# Patient Record
Sex: FEMALE | Race: WHITE | NOT HISPANIC OR LATINO | Employment: OTHER | URBAN - METROPOLITAN AREA
[De-identification: names, ages, dates, MRNs, and addresses within clinical notes are randomized per-mention and may not be internally consistent; named-entity substitution may affect disease eponyms.]

---

## 2024-05-20 ENCOUNTER — OFFICE VISIT (OUTPATIENT)
Dept: URGENT CARE | Facility: URGENT CARE | Age: 79
End: 2024-05-20
Payer: COMMERCIAL

## 2024-05-20 VITALS
OXYGEN SATURATION: 98 % | TEMPERATURE: 98 F | SYSTOLIC BLOOD PRESSURE: 118 MMHG | HEART RATE: 72 BPM | RESPIRATION RATE: 20 BRPM | DIASTOLIC BLOOD PRESSURE: 71 MMHG

## 2024-05-20 DIAGNOSIS — S05.01XA ABRASION OF RIGHT CORNEA, INITIAL ENCOUNTER: Primary | ICD-10-CM

## 2024-05-20 PROCEDURE — 99203 OFFICE O/P NEW LOW 30 MIN: CPT | Performed by: PHYSICIAN ASSISTANT

## 2024-05-20 RX ORDER — VALACYCLOVIR HYDROCHLORIDE 500 MG/1
500 TABLET, FILM COATED ORAL DAILY
COMMUNITY

## 2024-05-20 RX ORDER — DULOXETIN HYDROCHLORIDE 20 MG/1
CAPSULE, DELAYED RELEASE ORAL
COMMUNITY

## 2024-05-20 RX ORDER — LISINOPRIL 30 MG/1
TABLET ORAL
COMMUNITY
Start: 2023-06-02

## 2024-05-20 RX ORDER — ALENDRONATE SODIUM 70 MG/1
TABLET ORAL
COMMUNITY

## 2024-05-20 RX ORDER — PREDNISONE 1 MG/1
3 TABLET ORAL 2 TIMES DAILY
COMMUNITY

## 2024-05-20 RX ORDER — CHOLECALCIFEROL (VITAMIN D3) 10 MCG (400 UNIT) TABLET
10
COMMUNITY
Start: 2023-01-06

## 2024-05-20 RX ORDER — NITROFURANTOIN 25; 75 MG/1; MG/1
100 CAPSULE ORAL 2 TIMES DAILY
COMMUNITY
Start: 2024-01-23

## 2024-05-20 RX ORDER — FLUCONAZOLE 150 MG/1
TABLET ORAL
COMMUNITY
Start: 2024-01-23

## 2024-05-20 RX ORDER — ACETAMINOPHEN 500 MG
500 TABLET ORAL
COMMUNITY
Start: 2023-01-06

## 2024-05-20 RX ORDER — ERYTHROMYCIN 5 MG/G
0.5 OINTMENT OPHTHALMIC 3 TIMES DAILY
Qty: 3.5 G | Refills: 0 | Status: SHIPPED | OUTPATIENT
Start: 2024-05-20 | End: 2024-05-27

## 2024-05-20 RX ORDER — METHOTREXATE 2.5 MG/1
TABLET ORAL
COMMUNITY

## 2024-05-20 RX ORDER — ATORVASTATIN CALCIUM 20 MG/1
20 TABLET, FILM COATED ORAL DAILY
COMMUNITY

## 2024-05-20 NOTE — PROGRESS NOTES
SUBJECTIVE:  Chief Complaint:   Chief Complaint   Patient presents with    Eye Problem     R eye red  since this morning    R10/16 L 10/20 BOTH 10/20    URI     History of Present Illness:  Tonia Chicas is a 78 year old female who presents complaining of mild right eye redness, possible foreign body for 4 hour(s).   Onset/timing: upon waking.    Associated Signs and Symptoms: none  Treatment measures tried include: flushed with water   EXPOSURE: daughter's dog shedding    History reviewed. No pertinent past medical history.  Current Outpatient Medications   Medication Sig Dispense Refill    acetaminophen (TYLENOL) 500 MG tablet Take 500 mg by mouth      alendronate (FOSAMAX) 70 MG tablet TAKE ONE TABLET BY MOUTH ONCE A WEEK ON AN EMPTY STOMACH WITH 8 OUNCES OF WATER REMAIN UPRIGHT FOR 30 MINUTES AFTERWARD      atorvastatin (LIPITOR) 20 MG tablet Take 20 mg by mouth daily      Cholecalciferol (DELTA D3) 10 MCG (400 UNIT) TABS Take 10 mcg by mouth      DULoxetine (CYMBALTA) 20 MG capsule TAKE 1 CAPSULE BY MOUTH ONCE DAILY FOR MOOD DOSE CHANGE 11/16/2022      erythromycin (ROMYCIN) 5 MG/GM ophthalmic ointment Place 0.5 inches into the right eye 3 times daily for 7 days 3.5 g 0    fluconazole (DIFLUCAN) 150 MG tablet TAKE ONE TABLET BY MOUTH NOW, REPEAT DOSE IN 7 DAYS IF SYMPTOMS PERSIST.      lisinopril (ZESTRIL) 30 MG tablet TAKE 1 TABLET BY MOUTH ONCE DAILY FOR BLOOD PRESSURE. PILL SIZE CHANGE AS OF 01/06/2023      methotrexate 2.5 MG tablet TAKE 8 TABLETS BY MOUTH ONCE A WEEK. REMEMBER TO DO BLOOD TESTS EVERY 3 MONTHS      nitroFURantoin macrocrystal-monohydrate (MACROBID) 100 MG capsule Take 100 mg by mouth 2 times daily      omeprazole (PRILOSEC) 20 MG DR capsule Take 20 mg by mouth      predniSONE (DELTASONE) 1 MG tablet Take 3 mg by mouth 2 times daily      valACYclovir (VALTREX) 500 MG tablet Take 500 mg by mouth daily          ROS:  Review of systems negative except as stated above.    OBJECTIVE:  BP  118/71   Pulse 72   Temp 98  F (36.7  C)   Resp 20   SpO2 98%   General: no acute distress  Eye exam: discomfort resolved with topical analgesia, no uptake of flouresciene in right eye  left eye normal lid, conjunctiva, cornea, pupil and fundus.      ASSESSMENT:  (S05.01XA) Abrasion of right cornea, initial encounter  (primary encounter diagnosis)  Comment: will treat based on history, exam.  Plan: erythromycin (ROMYCIN) 5 MG/GM ophthalmic         ointment      Red flags and emergent follow up discussed, and understood by patient  Follow up with PCP if symptoms worsen or fail to improve in 2 days

## 2024-07-05 ENCOUNTER — OFFICE VISIT (OUTPATIENT)
Dept: URGENT CARE | Facility: URGENT CARE | Age: 79
End: 2024-07-05
Payer: COMMERCIAL

## 2024-07-05 VITALS
OXYGEN SATURATION: 98 % | SYSTOLIC BLOOD PRESSURE: 129 MMHG | TEMPERATURE: 97.1 F | DIASTOLIC BLOOD PRESSURE: 72 MMHG | HEART RATE: 61 BPM

## 2024-07-05 DIAGNOSIS — R58 ECCHYMOSIS: ICD-10-CM

## 2024-07-05 DIAGNOSIS — S51.819A SKIN TEAR OF FOREARM WITHOUT COMPLICATION, INITIAL ENCOUNTER: Primary | ICD-10-CM

## 2024-07-05 PROCEDURE — 99213 OFFICE O/P EST LOW 20 MIN: CPT | Performed by: FAMILY MEDICINE

## 2024-07-05 RX ORDER — CEPHALEXIN 500 MG/1
500 CAPSULE ORAL 4 TIMES DAILY
Qty: 40 CAPSULE | Refills: 0 | Status: SHIPPED | OUTPATIENT
Start: 2024-07-05 | End: 2024-07-05

## 2024-07-05 RX ORDER — CEPHALEXIN 500 MG/1
500 CAPSULE ORAL 4 TIMES DAILY
Qty: 40 CAPSULE | Refills: 0 | Status: SHIPPED | OUTPATIENT
Start: 2024-07-05

## 2024-07-05 NOTE — PROGRESS NOTES
SUBJECTIVE:  Chief Complaint   Patient presents with    Urgent Care     Fall 2 days ago and has couple of large cuts on her left arm     Tonia Chicas is a 78 year old female presents with a chief complaint of fall injury with wound on left arm    Lost her balance, ended up falling and sustained large skin tear on left forearm area.  No LOC.  Injury occurred on 7/3 around 3:00pm at home.      No blood thinner use.    Daughter did come over to help with dressing.  Applied vaseline gauze to wound but still sticking.    Takes tylenol for pain, will add ibuprofen and this does help ease up the pain    Tdap 2021    No past medical history on file.  Current Outpatient Medications   Medication Sig Dispense Refill    acetaminophen (TYLENOL) 500 MG tablet Take 500 mg by mouth      alendronate (FOSAMAX) 70 MG tablet TAKE ONE TABLET BY MOUTH ONCE A WEEK ON AN EMPTY STOMACH WITH 8 OUNCES OF WATER REMAIN UPRIGHT FOR 30 MINUTES AFTERWARD      atorvastatin (LIPITOR) 20 MG tablet Take 20 mg by mouth daily      Cholecalciferol (DELTA D3) 10 MCG (400 UNIT) TABS Take 10 mcg by mouth      DULoxetine (CYMBALTA) 20 MG capsule TAKE 1 CAPSULE BY MOUTH ONCE DAILY FOR MOOD DOSE CHANGE 11/16/2022      lisinopril (ZESTRIL) 30 MG tablet TAKE 1 TABLET BY MOUTH ONCE DAILY FOR BLOOD PRESSURE. PILL SIZE CHANGE AS OF 01/06/2023      methotrexate 2.5 MG tablet TAKE 8 TABLETS BY MOUTH ONCE A WEEK. REMEMBER TO DO BLOOD TESTS EVERY 3 MONTHS      omeprazole (PRILOSEC) 20 MG DR capsule Take 20 mg by mouth      predniSONE (DELTASONE) 1 MG tablet Take 3 mg by mouth 2 times daily      valACYclovir (VALTREX) 500 MG tablet Take 500 mg by mouth daily      fluconazole (DIFLUCAN) 150 MG tablet TAKE ONE TABLET BY MOUTH NOW, REPEAT DOSE IN 7 DAYS IF SYMPTOMS PERSIST. (Patient not taking: Reported on 7/5/2024)      nitroFURantoin macrocrystal-monohydrate (MACROBID) 100 MG capsule Take 100 mg by mouth 2 times daily (Patient not taking: Reported on 7/5/2024)        Social History     Tobacco Use    Smoking status: Not on file    Smokeless tobacco: Not on file   Substance Use Topics    Alcohol use: Not on file       ROS:  Review of systems negative except as stated above.    EXAM:   /72   Pulse 61   Temp 97.1  F (36.2  C) (Tympanic)   SpO2 98%   Gen: healthy,alert,no distress  Extremity: left forearm has large skin tear, denuded wound with scant bleeding after dressing was removed.  Had smaller skin tear wound on posterior left arm and large ecchymosis  There is not compromise to the distal circulation.  Pulses are +2 and CRT is brisk  PSYCH:alert, affect bright    X-RAY was not done.    ASSESSMENT/PLAN:   (S51.003R) Skin tear of forearm without complication, initial encounter  (primary encounter diagnosis)  Comment: s/p fall  Plan: cephALEXin (KEFLEX) 500 MG capsule,         DISCONTINUED: cephALEXin (KEFLEX) 500 MG         capsule            (R58) Ecchymosis  Comment: s/p fall, left elbow/arm  Plan: ice, heat, monitor      Reassurance given, reviewed wound care and due to large open skin wound/skin tear that has increase risk of wound infection - RX keflex given to prevent wound infection.  Discussed vaseline ointment or antibiotic ointment to wound, non-sticky telfa dressing and gauze wrap.  Okay to change dressing once a day.  Ice or heat to ecchymosis area.    Follow up in clinic if no improvement of wound in 1 week    Singh Collier MD  July 5, 2024 6:01 PM

## 2024-07-05 NOTE — PATIENT INSTRUCTIONS
Okay to take ibuprofen 200 mg - 3 tablets (600 mg) every 8 hours as needed.  Okay to take tylenol 500 mg - 2 tablets (1000 mg) every 6-8 hours as needed, do not exceed 3000 mg in 24 hours.    Take keflex to prevent wound infection (2-3 times a day), will need to take 4 times daily if wound is infected - redness, drainage, more pain    Ice or heat to bruise area   Doxycycline Counseling:  Patient counseled regarding possible photosensitivity and increased risk for sunburn.  Patient instructed to avoid sunlight, if possible.  When exposed to sunlight, patients should wear protective clothing, sunglasses, and sunscreen.  The patient was instructed to call the office immediately if the following severe adverse effects occur:  hearing changes, easy bruising/bleeding, severe headache, or vision changes.  The patient verbalized understanding of the proper use and possible adverse effects of doxycycline.  All of the patient's questions and concerns were addressed.

## 2025-04-04 NOTE — PROGRESS NOTES
"PHYSICAL THERAPY EVALUATION  Type of Visit: Evaluation       Fall Risk Screen:  Fall screen completed by: PT  Have you fallen 2 or more times in the past year?: Yes  Have you fallen and had an injury in the past year?: Yes  Is patient a fall risk?: Yes  Fall screen comments: coming to PT to address impairments    Subjective         Presenting condition or subjective complaint: unsteady and falls  Date of onset: 04/02/25 (order date due to chronicity of the problem)    Relevant medical history: Arthritis; Dizziness; Fibromyalgia; High blood pressure; Menopause; Osteoporosis; Pain at night or rest; Rheumatoid arthritis; Significant weakness; Vision problems   Dates & types of surgery: gallbladder removal    Prior diagnostic imaging/testing results: Other     Prior therapy history for the same diagnosis, illness or injury: No      Prior Level of Function  Transfers:   Ambulation:   ADL:   IADL:     Living Environment  Social support: Alone   Type of home: [facility]; Multi-level   Stairs to enter the home: No       Ramp: No   Stairs inside the home: Yes 17 Is there a railing: Yes     Help at home: Home management tasks (cooking, cleaning); Medication and/or finances; Assist for driving and community activities  Equipment owned: Straight Cane; Walker; Bath bench     Employment: Yes    Hobbies/Interests: sewing reading    Patient goals for therapy: walk more confidently without fear of falling    Pt reports she is fearful of falling. Most recent fall was within the past week. She typically falls forward. Pt does have dizziness but has not been dizzy in over a week. Her most recent fall was after walking up the stairs and then she fell forward. She has a 4ww but does not \"like it\". She uses her SEC most of the time for past few weeks. She goes up and down a lot of steps in her home. She was having bad vertigo for weeks- it has come and gone in the past but this time persisted for much longer. Her PCP wanted to schedule an " MRI and neurologist appt- she cancelled it and has not re-scheduled. She has been vestibular, ear and eye testing recently and did not find anything. Her family cleaned out her ears with drops and flushed her ears out at home. After that, her dizziness seems to have resolved.    Pain assessment:      Objective      Cognitive Status Examination  Orientation: Oriented to person, place and time   Level of Consciousness: Alert  Follows Commands and Answers Questions: 100% of the time  Personal Safety and Judgement: Intact  Memory:  per pt, impaired     OBSERVATION:   INTEGUMENTARY: Intact  POSTURE: Sitting Posture: Rounded shoulders, Forward head, Thoracic kyphosis increased  PALPATION:   RANGE OF MOTION: LE ROM WFL  STRENGTH:  assess ankle DF at next session    BED MOBILITY: Independent    TRANSFERS: Independent with UE support     WHEELCHAIR MOBILITY:     GAIT:   Level of Chambers: Independent  Assistive Device(s): Cane (single end)  Gait Deviations: Base of support increased  Stride length decreased  Ana decreased  Gait Distance: 30 ft  Stairs: not assessed    BALANCE:     SPECIAL TESTS  Functional Gait Assessment (FGA)      10 Meter Walk Test (Comfortable)  0.529 m/s without assistive device; age-matched norm: 1.132 m/s   10 Meter Walk Test (Fast)  0.710 m/s   6 Minute Walk Test (6MWT)      228 m with SEC; age-matched norm: 471 m  Did not require standing rest break, Fatigue/OMNI Effort Scale: 7-8/10   [place] Balance Scale (BBS)     5 Times Sit-to-Stand (5TSTS)  17.08 sec, with UE support, standard height     Dynamic Gait Index (DGI)     Timed Up and Go (TUG) - sec    Single Leg Stance Right (sec)    Single Leg Stance Left (sec)    Modified CTSIB Conditions (sec) Cond 1: 30 sec  Cond 2: 20 sec  Cond 4:   Cond 5 :    Romberg  (sec)    Sharpened Romberg (sec)    30 Second Sit to Stand (reps/height)    Mini-BESTest              SENSATION:     REFLEXES:   COORDINATION:   MUSCLE TONE:         Assessment & Plan    CLINICAL IMPRESSIONS  Medical Diagnosis: unsteadiness, gait impairment    Treatment Diagnosis: impairment of balance with household/community activities   Impression/Assessment: Patient is a 79 year old female with balance and gait impairments complaints.  The following significant findings have been identified: Pain, Decreased strength, Impaired balance, Impaired gait, Impaired muscle performance, Decreased activity tolerance, Impaired posture, and Instability. These impairments interfere with their ability to perform self care tasks, recreational activities, household chores, household mobility, and community mobility as compared to previous level of function.     Clinical Decision Making (Complexity):  Clinical Presentation: Stable/Uncomplicated  Clinical Presentation Rationale: based on medical and personal factors listed in PT evaluation  Clinical Decision Making (Complexity): Low complexity    PLAN OF CARE  Treatment Interventions:  Modalities: Cryotherapy, Hot Pack  Interventions: Gait Training, Manual Therapy, Neuromuscular Re-education, Therapeutic Activity, Therapeutic Exercise, Self-Care/Home Management    Long Term Goals     PT Goal 1  Goal Identifier: HEP  Goal Description: Pt will demonstrate IND with strength, balance, and muscular and aerobic endurance HEP, while working to improve capacity for functional mobility.  Goal Progress: Initiated at evaluation; see PTRx.  Target Date: 07/05/25  PT Goal 2  Goal Identifier: 5xSTS  Goal Description: Pt will complete x5 STS in <12s without use of B UEs, to demonstrate increased B LE strength and reduced risk for falling.  Target Date: 07/05/25  PT Goal 3  Goal Identifier: FGA  Goal Description: Pt will score >22/30pts on FGA, to demonstrate improved dynamic balance and postural control with ambulation and decreased risk for falling with functional mobility.  Target Date: 07/05/25  PT Goal 4  Goal Identifier: 10 MWT  Goal Description: Patient will achieve MDC  for self-selected and fast gait speeds to show improved gait efficiency, bobby, and mechanics to reduce risk for falling.  Goal Progress: baseline: comf=0.529m/s, fast=0.710m/s; at inc risk for falling.  Target Date: 07/05/25  PT Goal 5  Goal Identifier: 6MWT  Goal Description: Pt will achieve an increased ambulation distance to meet the MDC for the 6MWT to indicate an improved tolerance for aerobic exercise and community ambulation.  Goal Progress: baseline: 228 m without AD  Target Date: 07/05/25      Frequency of Treatment: 1-2x/week, decreasing frequency as able  Duration of Treatment: 90 days    Recommended Referrals to Other Professionals:   Education Assessment:   Learner/Method: Patient;Listening;Demonstration;Pictures/Video;Reading;No Barriers to Learning    Risks and benefits of evaluation/treatment have been explained.   Patient/Family/caregiver agrees with Plan of Care.     Evaluation Time:     PT Eval, Low Complexity Minutes (07318): 27       Signing Clinician: Sydni Zhou, PT        [facility]                                                                                   OUTPATIENT PHYSICAL THERAPY      PLAN OF TREATMENT FOR OUTPATIENT REHABILITATION   Patient's Last Name, First Name, MICHAELChristosGILBERTChristos  Juan Francisco,Tonia  ARIAN YOB: 1945   Provider's Name   [facility]   Medical Record No.  [id number]     Onset Date: 04/02/25 (order date due to chronicity of the problem)  Start of Care Date: 04/07/25     Medical Diagnosis:  unsteadiness, gait impairment      PT Treatment Diagnosis:  impairment of balance with household/community activities Plan of Treatment  Frequency/Duration: 1-2x/week, decreasing frequency as able/ 90 days    Certification date from 04/07/25 to 07/05/25         See note for plan of treatment details and functional goals     Sydni Zhou, PT                         I CERTIFY THE NEED FOR THESE SERVICES  FURNISHED UNDER        THIS PLAN OF TREATMENT AND WHILE UNDER MY CARE .             Physician Signature               Date    X_____________________________________________________                  Referring Provider:  Trinity Zheng    Initial Assessment  See Epic Evaluation- Start of Care Date: 04/07/25

## 2025-04-07 ENCOUNTER — THERAPY VISIT (OUTPATIENT)
Dept: PHYSICAL THERAPY | Facility: CLINIC | Age: 80
End: 2025-04-07
Payer: COMMERCIAL

## 2025-04-07 DIAGNOSIS — Z74.09 IMPAIRED FUNCTIONAL MOBILITY, BALANCE, GAIT, AND ENDURANCE: ICD-10-CM

## 2025-04-07 DIAGNOSIS — R26.81 UNSTEADINESS ON FEET: Primary | ICD-10-CM

## 2025-04-07 PROCEDURE — 97161 PT EVAL LOW COMPLEX 20 MIN: CPT | Mod: GP | Performed by: PHYSICAL THERAPIST

## 2025-04-07 PROCEDURE — 97112 NEUROMUSCULAR REEDUCATION: CPT | Mod: GP | Performed by: PHYSICAL THERAPIST

## 2025-04-07 PROCEDURE — 97530 THERAPEUTIC ACTIVITIES: CPT | Mod: GP | Performed by: PHYSICAL THERAPIST

## 2025-05-01 ENCOUNTER — THERAPY VISIT (OUTPATIENT)
Dept: PHYSICAL THERAPY | Facility: CLINIC | Age: 80
End: 2025-05-01
Payer: COMMERCIAL

## 2025-05-01 DIAGNOSIS — Z74.09 IMPAIRED FUNCTIONAL MOBILITY, BALANCE, GAIT, AND ENDURANCE: ICD-10-CM

## 2025-05-01 DIAGNOSIS — R26.81 UNSTEADINESS ON FEET: Primary | ICD-10-CM

## 2025-07-03 DIAGNOSIS — M06.9 ARTHRITIS, RHEUMATOID (H): Primary | ICD-10-CM
